# Patient Record
Sex: MALE | Race: WHITE
[De-identification: names, ages, dates, MRNs, and addresses within clinical notes are randomized per-mention and may not be internally consistent; named-entity substitution may affect disease eponyms.]

---

## 2019-12-21 ENCOUNTER — HOSPITAL ENCOUNTER (EMERGENCY)
Dept: HOSPITAL 47 - EC | Age: 29
LOS: 1 days | Discharge: HOME | End: 2019-12-22
Payer: COMMERCIAL

## 2019-12-21 VITALS — TEMPERATURE: 98.1 F

## 2019-12-21 DIAGNOSIS — F17.200: ICD-10-CM

## 2019-12-21 DIAGNOSIS — F32.9: Primary | ICD-10-CM

## 2019-12-21 DIAGNOSIS — R45.851: ICD-10-CM

## 2019-12-21 DIAGNOSIS — Z63.79: ICD-10-CM

## 2019-12-21 PROCEDURE — 82075 ASSAY OF BREATH ETHANOL: CPT

## 2019-12-21 PROCEDURE — 99285 EMERGENCY DEPT VISIT HI MDM: CPT

## 2019-12-21 NOTE — ED
Psych HPI





- General


Source: patient, RN notes reviewed, old records reviewed


Mode of arrival: ambulatory





- History of Present Illness


MD Complaint: suicidal ideation, feels depressed


-: days(s)


Associated Psychiatric Symptoms: depression, suicidal ideation


History of same: Yes


Quality: constant


Improves With: medication


Worsens With: medication


Context: recent drug abuse, significant life stressor


Associated Symptoms: denies other symptoms


Treatments Prior to Arrival: placed on mental health hold


If Self Harm: admits thoughts of self harm





<Prosper Pinto - Last Filed: 12/21/19 20:36>





<Tereso Cotter - Last Filed: 12/22/19 00:41>





- General


Chief Complaint: Psychiatric Symptoms


Stated Complaint: Mental Health


Time Seen by Provider: 12/21/19 20:12





- History of Present Illness


Initial Comments: 





this is a 2-year-old male date ER for evaluation of severe depression decreased 

desire to live significant life stressors including possible impending loss of 

home inability care of child moistly relieving him, just also having difficulty 

with friend's death difficulties in his social clubs as well as his workplace.  

Patient denying drugs or alcohol cipher marijuana no history of psychiatric 

illness or prior evaluation for superduper depression and suicidal ideation.  

Patient is crying during history taking (Prosper Pinto)





- Related Data


                                    Allergies











Allergy/AdvReac Type Severity Reaction Status Date / Time


 


No Known Allergies Allergy   Verified 12/21/19 20:05














Review of Systems


ROS Other: All systems not noted in ROS Statement are negative.





<Prosper Pinto - Last Filed: 12/21/19 20:36>


ROS Other: All systems not noted in ROS Statement are negative.





<Tereso Cotter - Last Filed: 12/22/19 00:41>


ROS Statement: 


Those systems with pertinent positive or pertinent negative responses have been 

documented in the HPI.








Past Medical History


Past Medical History: No Reported History


Additional Past Medical History / Comment(s): ppd


History of Any Multi-Drug Resistant Organisms: None Reported


Past Surgical History: No Surgical Hx Reported


Past Psychological History: Anxiety, Depression


Smoking Status: Current every day smoker


Past Alcohol Use History: Occasional


Past Drug Use History: Marijuana





<Prosper Pinto - Last Filed: 12/21/19 20:36>





General Exam


Limitations: no limitations


General appearance: alert, in no apparent distress


Head exam: Present: atraumatic, normocephalic, normal inspection


Eye exam: Present: normal appearance, PERRL, EOMI.  Absent: scleral icterus, 

conjunctival injection, periorbital swelling


ENT exam: Present: normal exam, mucous membranes moist


Neck exam: Present: normal inspection.  Absent: tenderness, meningismus, 

lymphadenopathy


Respiratory exam: Present: normal lung sounds bilaterally.  Absent: respiratory 

distress, wheezes, rales, rhonchi, stridor


Cardiovascular Exam: Present: regular rate, normal rhythm, normal heart sounds. 

Absent: systolic murmur, diastolic murmur, rubs, gallop, clicks


GI/Abdominal exam: Present: soft, normal bowel sounds.  Absent: distended, 

tenderness, guarding, rebound, rigid


Extremities exam: Present: normal inspection, full ROM, normal capillary refill.

 Absent: tenderness, pedal edema, joint swelling, calf tenderness


Back exam: Present: normal inspection


Neurological exam: Present: alert, oriented X3, CN II-XII intact


Psychiatric exam: Present: normal affect, normal mood


Skin exam: Present: warm, dry, intact, normal color.  Absent: rash





<Prosper Pinto - Last Filed: 12/21/19 20:36>





Course





<Prosper Pinto - Last Filed: 12/21/19 20:36>





                                   Vital Signs











  12/21/19





  20:00


 


Temperature 98.1 F


 


Pulse Rate 100


 


Respiratory 20





Rate 


 


Blood Pressure 141/89


 


O2 Sat by Pulse 96





Oximetry 














- Reevaluation(s)


Reevaluation #1: 





12/21/19 20:37


medical clear for psychiatric evaluation (Prosper Pinto)





Disposition





<Prosper Pinto - Last Filed: 12/21/19 20:36>


Is patient prescribed a controlled substance at d/c from ED?: No





<Tereso Cotter - Last Filed: 12/22/19 00:41>


Clinical Impression: 


 Mood disorder





Disposition: HOME SELF-CARE


Condition: Good


Instructions (If sedation given, give patient instructions):  Mood Disorders 

(ED)


Referrals: 


None,Stated [Primary Care Provider] - 1-2 days

## 2019-12-22 VITALS — SYSTOLIC BLOOD PRESSURE: 116 MMHG | HEART RATE: 98 BPM | RESPIRATION RATE: 47 BRPM | DIASTOLIC BLOOD PRESSURE: 68 MMHG

## 2020-01-15 ENCOUNTER — HOSPITAL ENCOUNTER (EMERGENCY)
Dept: HOSPITAL 47 - EC | Age: 30
Discharge: HOME | End: 2020-01-15
Payer: COMMERCIAL

## 2020-01-15 VITALS — RESPIRATION RATE: 18 BRPM

## 2020-01-15 VITALS — DIASTOLIC BLOOD PRESSURE: 78 MMHG | SYSTOLIC BLOOD PRESSURE: 114 MMHG | HEART RATE: 75 BPM

## 2020-01-15 VITALS — TEMPERATURE: 97.7 F

## 2020-01-15 DIAGNOSIS — F17.200: ICD-10-CM

## 2020-01-15 DIAGNOSIS — R10.84: Primary | ICD-10-CM

## 2020-01-15 DIAGNOSIS — R11.2: ICD-10-CM

## 2020-01-15 DIAGNOSIS — D72.829: ICD-10-CM

## 2020-01-15 DIAGNOSIS — R51: ICD-10-CM

## 2020-01-15 LAB
ALBUMIN SERPL-MCNC: 4.8 G/DL (ref 3.5–5)
ALP SERPL-CCNC: 83 U/L (ref 38–126)
ALT SERPL-CCNC: 94 U/L (ref 4–49)
ANION GAP SERPL CALC-SCNC: 10 MMOL/L
APTT BLD: 23.8 SEC (ref 22–30)
AST SERPL-CCNC: 49 U/L (ref 17–59)
BASOPHILS # BLD AUTO: 0.2 K/UL (ref 0–0.2)
BASOPHILS NFR BLD AUTO: 2 %
BUN SERPL-SCNC: 23 MG/DL (ref 9–20)
CALCIUM SPEC-MCNC: 9.8 MG/DL (ref 8.4–10.2)
CHLORIDE SERPL-SCNC: 105 MMOL/L (ref 98–107)
CO2 SERPL-SCNC: 25 MMOL/L (ref 22–30)
EOSINOPHIL # BLD AUTO: 0.2 K/UL (ref 0–0.7)
EOSINOPHIL NFR BLD AUTO: 2 %
ERYTHROCYTE [DISTWIDTH] IN BLOOD BY AUTOMATED COUNT: 5.62 M/UL (ref 4.3–5.9)
ERYTHROCYTE [DISTWIDTH] IN BLOOD: 12.3 % (ref 11.5–15.5)
GLUCOSE SERPL-MCNC: 114 MG/DL (ref 74–99)
HCT VFR BLD AUTO: 50.1 % (ref 39–53)
HGB BLD-MCNC: 17.4 GM/DL (ref 13–17.5)
HYALINE CASTS UR QL AUTO: 3 /LPF (ref 0–2)
INR PPP: 0.9 (ref ?–1.2)
LYMPHOCYTES # SPEC AUTO: 0.9 K/UL (ref 1–4.8)
LYMPHOCYTES NFR SPEC AUTO: 7 %
MAGNESIUM SPEC-SCNC: 1.6 MG/DL (ref 1.6–2.3)
MCH RBC QN AUTO: 31 PG (ref 25–35)
MCHC RBC AUTO-ENTMCNC: 34.8 G/DL (ref 31–37)
MCV RBC AUTO: 89.1 FL (ref 80–100)
MONOCYTES # BLD AUTO: 0.6 K/UL (ref 0–1)
MONOCYTES NFR BLD AUTO: 4 %
NEUTROPHILS # BLD AUTO: 11.8 K/UL (ref 1.3–7.7)
NEUTROPHILS NFR BLD AUTO: 85 %
PH UR: 5 [PH] (ref 5–8)
PLATELET # BLD AUTO: 242 K/UL (ref 150–450)
POTASSIUM SERPL-SCNC: 4.5 MMOL/L (ref 3.5–5.1)
PROT SERPL-MCNC: 8 G/DL (ref 6.3–8.2)
PT BLD: 9.5 SEC (ref 9–12)
RBC UR QL: <1 /HPF (ref 0–5)
SODIUM SERPL-SCNC: 140 MMOL/L (ref 137–145)
SP GR UR: 1.02 (ref 1–1.03)
SQUAMOUS UR QL AUTO: <1 /HPF (ref 0–4)
UROBILINOGEN UR QL STRIP: <2 MG/DL (ref ?–2)
WBC # BLD AUTO: 13.9 K/UL (ref 3.8–10.6)
WBC #/AREA URNS HPF: 1 /HPF (ref 0–5)

## 2020-01-15 PROCEDURE — 74022 RADEX COMPL AQT ABD SERIES: CPT

## 2020-01-15 PROCEDURE — 85730 THROMBOPLASTIN TIME PARTIAL: CPT

## 2020-01-15 PROCEDURE — 74177 CT ABD & PELVIS W/CONTRAST: CPT

## 2020-01-15 PROCEDURE — 85025 COMPLETE CBC W/AUTO DIFF WBC: CPT

## 2020-01-15 PROCEDURE — 96374 THER/PROPH/DIAG INJ IV PUSH: CPT

## 2020-01-15 PROCEDURE — 99284 EMERGENCY DEPT VISIT MOD MDM: CPT

## 2020-01-15 PROCEDURE — 93005 ELECTROCARDIOGRAM TRACING: CPT

## 2020-01-15 PROCEDURE — 81001 URINALYSIS AUTO W/SCOPE: CPT

## 2020-01-15 PROCEDURE — 36415 COLL VENOUS BLD VENIPUNCTURE: CPT

## 2020-01-15 PROCEDURE — 83690 ASSAY OF LIPASE: CPT

## 2020-01-15 PROCEDURE — 83735 ASSAY OF MAGNESIUM: CPT

## 2020-01-15 PROCEDURE — 70450 CT HEAD/BRAIN W/O DYE: CPT

## 2020-01-15 PROCEDURE — 85610 PROTHROMBIN TIME: CPT

## 2020-01-15 PROCEDURE — 80053 COMPREHEN METABOLIC PANEL: CPT

## 2020-01-15 NOTE — CT
EXAMINATION TYPE: CT brain wo con

 

DATE OF EXAM: 1/15/2020

 

COMPARISON: None.

 

HISTORY: Fall, head trauma with headache.

 

CT DLP: 996 mGycm.  Automated Exposure Control for Dose Reduction was Utilized.

 

 

TECHNIQUE: CT scan of the head is performed without contrast.

 

FINDINGS:   There is no acute intracranial hemorrhage, mass effect, or midline shift identified.  The
 ventricles and sulci are within normal limits in size.  Gray-white matter differentiation is preserv
ed. The globes are intact and the visualized sinuses are clear. The calvarium is intact.

 

IMPRESSION:  No acute intracranial hemorrhage, mass effect, or midline shift is seen.

## 2020-01-15 NOTE — XR
EXAMINATION TYPE: XR abdomen acute w cxr

 

DATE OF EXAM: 1/15/2020

 

COMPARISON: NONE

 

HISTORY: Pain, nausea and dizziness

 

TECHNIQUE:  Supine, upright, and frontal chest views of the abdomen and chest are obtained.

 

FINDINGS:  

 

There is no evidence for pneumoperitoneum.  

 

The bowel gas pattern is unremarkable as there is air throughout nondilated small and large bowel.  

 

No sizeable air fluid levels.

 

No mass effects are seen.  

 

No unusual calcifications.  

 

IMPRESSION: 

Unremarkable study

## 2020-01-15 NOTE — ED
General Adult HPI





- General


Chief complaint: Head Injury


Stated complaint: FALL


Time Seen by Provider: 01/15/20 07:45


Source: patient


Mode of arrival: wheelchair


Limitations: no limitations





- History of Present Illness


Initial comments: 


Dictation was produced using dragon dictation software. please excuse any 

grammatical, word or spelling errors. 





Chief Complaint: 29-year-old male with no significant past medical history 

presents with episode of lower extremity weakness, fall and head trauma.  He 

also has abdominal pain.





History of Present Illness: Is a 29-year-old male he woke up this morning 

getting ready for work when she was walking back from the bathroom.  He felt a 

brief episode of lower extremity weakness causing him to fall.  Patient was at 

home by himself.  Patient does not know if he lost consciousness.  Patient has 

been battling nausea vomiting and diarrhea for the last several days.  Patient 

states he is also diffuse abdominal pain.  He has been feeling nauseous.  

Patient reports that his pain is diffuse however worse in the suprapubic area.  

Denies any medical problems.  Patient states that after that are not he felt 

better.  States that his emesis is nonbloody and non-bilious.  Same with his 

diarrhea is nonbloody nonbilious.  He reports his diarrhea is watery.  No recent

travel, antibiotics or hospitalizations.  She denies any weakness, numbness to 

any paresthesias to extremities.  He has no previous history of syncope.  He 

does report a mild headache since the fall.  Fall occurred approximately 1 hour 

prior to arrival.








The ROS documented in this emergency department record has been reviewed and 

confirmed by me.  Those systems with pertinent positive or negative responses 

have been documented in the HPI.  All other systems are other negative and/or 

noncontributory.








PHYSICAL EXAM:


General Impression: Alert and oriented x3, not in acute distress


HEENT: Small hematoma over the forehead, extra-ocular movements intact, pupils 

equal and reactive to light bilaterally, mucous membranes moist.


Cardiovascular: Heart regular rate and rhythm, S1&S2 audible, no murmurs, rubs 

or gallops


Chest: Lungs clear to auscultation bilaterally, no rhonchi, no wheeze, no rales


Abdomen: Diffuse abdominal tenderness worse in the suprapubic area, there is 

tenderness at McBurney's point


Musculoskeletal: Pulses present and equal in all extremities, no peripheral 

edema


Motor:  no focal deficits noted


Neurological: CN II-XII grossly intact, no focal motor or sensory deficits noted


Skin: Intact with no visualized rashes


Psych: Normal affect and mood





ED course: 29-year-old male presents with episode concerning for syncope, fall, 

head trauma and abdominal pain.  Vital signs upon arrival are within acceptable 

limits.


Laboratory evaluation obtained.  Mild leukocytosis of 13.9, coag panel 

unremarkable.  Metabolic panel is negative.  Urinalysis is negative.  Computed 

tomography scan of the brain, acute abdominal series is negative.  Given the 

patient an elevated white count there was concern of acute appendicitis.  CT 

abdomen and pelvis was unremarkable for appendicitis.  Patient observed in 

emergency department for couple hours.  No changes in symptoms.  Patient clear 

for discharge.





EKG interpretation: Ventricular rate 82, normal sinus rhythm,.  Interval 1:30, 

care is 92, QTc 432. No NJ prolongation, no QTC prolongation, no ST or T-wave 

changes noted.    Overall, this EKG is unremarkable











- Related Data


                                    Allergies











Allergy/AdvReac Type Severity Reaction Status Date / Time


 


No Known Allergies Allergy   Verified 12/21/19 20:05














Review of Systems


ROS Statement: 


Those systems with pertinent positive or pertinent negative responses have been 

documented in the HPI.





ROS Other: All systems not noted in ROS Statement are negative.





Past Medical History


Past Medical History: No Reported History


Additional Past Medical History / Comment(s): ppd


History of Any Multi-Drug Resistant Organisms: None Reported


Past Surgical History: No Surgical Hx Reported


Past Psychological History: Anxiety, Bipolar, Depression


Smoking Status: Current every day smoker


Past Alcohol Use History: Occasional


Past Drug Use History: Marijuana





General Exam


Limitations: no limitations





Course


                                   Vital Signs











  01/15/20 01/15/20





  07:41 08:37


 


Temperature 97.7 F 


 


Pulse Rate 93 85


 


Respiratory 19 18





Rate  


 


Blood Pressure 118/81 123/77


 


O2 Sat by Pulse 96 98





Oximetry  














Medical Decision Making





- Lab Data


Result diagrams: 


                                 01/15/20 08:05





                                 01/15/20 08:05


                                   Lab Results











  01/15/20 01/15/20 01/15/20 Range/Units





  08:05 08:05 08:05 


 


WBC  13.9 H    (3.8-10.6)  k/uL


 


RBC  5.62    (4.30-5.90)  m/uL


 


Hgb  17.4    (13.0-17.5)  gm/dL


 


Hct  50.1    (39.0-53.0)  %


 


MCV  89.1    (80.0-100.0)  fL


 


MCH  31.0    (25.0-35.0)  pg


 


MCHC  34.8    (31.0-37.0)  g/dL


 


RDW  12.3    (11.5-15.5)  %


 


Plt Count  242    (150-450)  k/uL


 


Neutrophils %  85    %


 


Lymphocytes %  7    %


 


Monocytes %  4    %


 


Eosinophils %  2    %


 


Basophils %  2    %


 


Neutrophils #  11.8 H    (1.3-7.7)  k/uL


 


Lymphocytes #  0.9 L    (1.0-4.8)  k/uL


 


Monocytes #  0.6    (0-1.0)  k/uL


 


Eosinophils #  0.2    (0-0.7)  k/uL


 


Basophils #  0.2    (0-0.2)  k/uL


 


PT   9.5   (9.0-12.0)  sec


 


INR   0.9   (<1.2)  


 


APTT   23.8   (22.0-30.0)  sec


 


Sodium    140  (137-145)  mmol/L


 


Potassium    4.5  (3.5-5.1)  mmol/L


 


Chloride    105  ()  mmol/L


 


Carbon Dioxide    25  (22-30)  mmol/L


 


Anion Gap    10  mmol/L


 


BUN    23 H  (9-20)  mg/dL


 


Creatinine    0.76  (0.66-1.25)  mg/dL


 


Est GFR (CKD-EPI)AfAm    >90  (>60 ml/min/1.73 sqM)  


 


Est GFR (CKD-EPI)NonAf    >90  (>60 ml/min/1.73 sqM)  


 


Glucose    114 H  (74-99)  mg/dL


 


Calcium    9.8  (8.4-10.2)  mg/dL


 


Magnesium    1.6  (1.6-2.3)  mg/dL


 


Total Bilirubin    0.6  (0.2-1.3)  mg/dL


 


AST    49  (17-59)  U/L


 


ALT    94 H  (4-49)  U/L


 


Alkaline Phosphatase    83  ()  U/L


 


Total Protein    8.0  (6.3-8.2)  g/dL


 


Albumin    4.8  (3.5-5.0)  g/dL


 


Lipase    25  ()  U/L


 


Urine Color     


 


Urine Appearance     (Clear)  


 


Urine pH     (5.0-8.0)  


 


Ur Specific Gravity     (1.001-1.035)  


 


Urine Protein     (Negative)  


 


Urine Glucose (UA)     (Negative)  


 


Urine Ketones     (Negative)  


 


Urine Blood     (Negative)  


 


Urine Nitrite     (Negative)  


 


Urine Bilirubin     (Negative)  


 


Urine Urobilinogen     (<2.0)  mg/dL


 


Ur Leukocyte Esterase     (Negative)  


 


Urine RBC     (0-5)  /hpf


 


Urine WBC     (0-5)  /hpf


 


Ur Squamous Epith Cells     (0-4)  /hpf


 


Hyaline Casts     (0-2)  /lpf


 


Urine Mucus     (None)  /hpf














  01/15/20 Range/Units





  08:05 


 


WBC   (3.8-10.6)  k/uL


 


RBC   (4.30-5.90)  m/uL


 


Hgb   (13.0-17.5)  gm/dL


 


Hct   (39.0-53.0)  %


 


MCV   (80.0-100.0)  fL


 


MCH   (25.0-35.0)  pg


 


MCHC   (31.0-37.0)  g/dL


 


RDW   (11.5-15.5)  %


 


Plt Count   (150-450)  k/uL


 


Neutrophils %   %


 


Lymphocytes %   %


 


Monocytes %   %


 


Eosinophils %   %


 


Basophils %   %


 


Neutrophils #   (1.3-7.7)  k/uL


 


Lymphocytes #   (1.0-4.8)  k/uL


 


Monocytes #   (0-1.0)  k/uL


 


Eosinophils #   (0-0.7)  k/uL


 


Basophils #   (0-0.2)  k/uL


 


PT   (9.0-12.0)  sec


 


INR   (<1.2)  


 


APTT   (22.0-30.0)  sec


 


Sodium   (137-145)  mmol/L


 


Potassium   (3.5-5.1)  mmol/L


 


Chloride   ()  mmol/L


 


Carbon Dioxide   (22-30)  mmol/L


 


Anion Gap   mmol/L


 


BUN   (9-20)  mg/dL


 


Creatinine   (0.66-1.25)  mg/dL


 


Est GFR (CKD-EPI)AfAm   (>60 ml/min/1.73 sqM)  


 


Est GFR (CKD-EPI)NonAf   (>60 ml/min/1.73 sqM)  


 


Glucose   (74-99)  mg/dL


 


Calcium   (8.4-10.2)  mg/dL


 


Magnesium   (1.6-2.3)  mg/dL


 


Total Bilirubin   (0.2-1.3)  mg/dL


 


AST   (17-59)  U/L


 


ALT   (4-49)  U/L


 


Alkaline Phosphatase   ()  U/L


 


Total Protein   (6.3-8.2)  g/dL


 


Albumin   (3.5-5.0)  g/dL


 


Lipase   ()  U/L


 


Urine Color  Yellow  


 


Urine Appearance  Clear  (Clear)  


 


Urine pH  5.0  (5.0-8.0)  


 


Ur Specific Gravity  1.025  (1.001-1.035)  


 


Urine Protein  Negative  (Negative)  


 


Urine Glucose (UA)  Negative  (Negative)  


 


Urine Ketones  Negative  (Negative)  


 


Urine Blood  Trace H  (Negative)  


 


Urine Nitrite  Negative  (Negative)  


 


Urine Bilirubin  Negative  (Negative)  


 


Urine Urobilinogen  <2.0  (<2.0)  mg/dL


 


Ur Leukocyte Esterase  Negative  (Negative)  


 


Urine RBC  <1  (0-5)  /hpf


 


Urine WBC  1  (0-5)  /hpf


 


Ur Squamous Epith Cells  <1  (0-4)  /hpf


 


Hyaline Casts  3 H  (0-2)  /lpf


 


Urine Mucus  Few H  (None)  /hpf














Disposition


Clinical Impression: 


 Abdominal pain





Disposition: HOME SELF-CARE


Condition: Good


Instructions (If sedation given, give patient instructions):  Abdominal Pain 

(ED)


Is patient prescribed a controlled substance at d/c from ED?: No


Referrals: 


None,Stated [Primary Care Provider] - 1-2 days


Time of Disposition: 09:36

## 2020-01-15 NOTE — CT
EXAMINATION TYPE: CT abdomen pelvis w con

 

DATE OF EXAM: 1/15/2020

 

COMPARISON: Chest x-ray with acute abdominal series from earlier today

 

HISTORY: RLQ pain

 

CT DLP: 1330.1 mGycm

Automated exposure control for dose reduction was used.

 

TECHNIQUE:  Helical acquisition of images was performed from the lung bases through the pelvis.

 

CONTRAST: 

Performed without Oral Contrast and with IV Contrast, patient injected with 100 mL of Isovue 300.

 

FINDINGS: 

 

LUNG BASES: Dependent atelectasis in both bases. Heart size upper limits of normal.

 

LIVER/GB: Liver is markedly hypodense consistent with diffuse fatty infiltration. It measures upper l
imits of normal.

 

PANCREAS: No significant abnormality is seen.

 

SPLEEN: Spleen measures upper limits of normal.

 

ADRENALS: No significant abnormality is seen.

 

KIDNEYS: Symmetric cortical medullary uptake and excretion without hydronephrosis seen bilaterally. N
o intraluminal calculus in bladder. Single right pelvic phlebolith on axial image 87

 

ABDOMINAL AND PELVIC ADENOPATHY:  None visualized

 

REPRODUCTIVE ORGANS: No significant abnormality is seen

 

URINARY BLADDER:  No significant abnormality is seen.

 

OSSEOUS STRUCTURES:  No significant abnormality is seen.

 

BOWEL:  Suboptimal evaluation of bowel without enteric contrast. Stomach is poorly distended and thus
 suboptimally evaluated. No suspicious small or large bowel dilatation. Normal-appearing appendix is 
seen and coronal images 43 through 51 and axial images 60 through 65. No suspicious dilatation or hector
rounding fat stranding. Mild wall thickening through the left and sigmoid colon with occasional diver
ticula involving sigmoid colon. No significant fat stranding. Terminal ileum is noted within normal l
imits coronal image 46.

 

OTHER: Tiny fat-containing umbilical hernia axial image 54.

 

IMPRESSION:

1. No CT evidence for acute appendicitis.

2. Perhaps mild distal colitis, correlate clinically. Differential includes infectious and inflammato
ry etiologies. Otherwise no suspicious acute findings seen to account for patient's symptoms.

## 2020-02-19 ENCOUNTER — HOSPITAL ENCOUNTER (EMERGENCY)
Dept: HOSPITAL 47 - EC | Age: 30
Discharge: HOME | End: 2020-02-19
Payer: COMMERCIAL

## 2020-02-19 VITALS
RESPIRATION RATE: 18 BRPM | HEART RATE: 93 BPM | DIASTOLIC BLOOD PRESSURE: 77 MMHG | SYSTOLIC BLOOD PRESSURE: 115 MMHG | TEMPERATURE: 98.7 F

## 2020-02-19 DIAGNOSIS — H92.03: ICD-10-CM

## 2020-02-19 DIAGNOSIS — F17.200: ICD-10-CM

## 2020-02-19 DIAGNOSIS — J10.1: Primary | ICD-10-CM

## 2020-02-19 DIAGNOSIS — R00.0: ICD-10-CM

## 2020-02-19 DIAGNOSIS — R11.2: ICD-10-CM

## 2020-02-19 PROCEDURE — 71046 X-RAY EXAM CHEST 2 VIEWS: CPT

## 2020-02-19 PROCEDURE — 87502 INFLUENZA DNA AMP PROBE: CPT

## 2020-02-19 PROCEDURE — 87081 CULTURE SCREEN ONLY: CPT

## 2020-02-19 PROCEDURE — 99283 EMERGENCY DEPT VISIT LOW MDM: CPT

## 2020-02-19 PROCEDURE — 87430 STREP A AG IA: CPT

## 2020-02-19 NOTE — XR
EXAMINATION TYPE: XR chest 2V

 

DATE OF EXAM: 2/19/2020

 

COMPARISON: None

 

HISTORY: 29-year-old male cough and fever

 

TECHNIQUE:  PA and lateral views

 

FINDINGS:  

The cardiomediastinal silhouette, aorta, and pulmonary vasculature are within normal limits. Slightly
 low lung volumes with some crowded vascular markings. No consolidation or pleural effusion.

 

 

IMPRESSION:  

No acute cardiopulmonary process.

## 2021-01-30 NOTE — ED
General Adult HPI





- General


Chief complaint: Fever


Stated complaint: vomiting


Time Seen by Provider: 02/19/20 12:43


Source: patient, RN notes reviewed


Mode of arrival: ambulatory


Limitations: no limitations





- History of Present Illness


Initial comments: 





29-year-old male without any significant past medical history presents to the 

emergency department for a chief complaint of fever.  Patient states he has had 

a fever for the past 3 days.  States that he has had nausea vomiting as well.  

He denies diarrhea.  He denies any associated abdominal pain.  Patient states 

that he has had cough congestion sore throat and bilateral ear pain for the past

3 days as well.  He does admit to body aches.  Patient states he has been taking

aspirin and Motrin for his fevers.Patient has no other complaints at this time 

including shortness of breath, chest pain, abdominal pain, nausea or vomiting, 

headache, or visual changes.





- Related Data


                                  Previous Rx's











 Medication  Instructions  Recorded


 


Ondansetron [Zofran ODT] 4 mg PO Q8HR PRN #15 tab 02/19/20











                                    Allergies











Allergy/AdvReac Type Severity Reaction Status Date / Time


 


No Known Allergies Allergy   Verified 02/19/20 12:40














Review of Systems


ROS Statement: 


Those systems with pertinent positive or pertinent negative responses have been 

documented in the HPI.





ROS Other: All systems not noted in ROS Statement are negative.





Past Medical History


Past Medical History: No Reported History


Additional Past Medical History / Comment(s): ppd


History of Any Multi-Drug Resistant Organisms: None Reported


Past Surgical History: No Surgical Hx Reported


Past Psychological History: Anxiety, Bipolar, Depression


Smoking Status: Current every day smoker


Past Alcohol Use History: Occasional


Past Drug Use History: Marijuana





General Exam


Limitations: no limitations


General appearance: alert, in no apparent distress


Head exam: Present: atraumatic, normocephalic, normal inspection


Eye exam: Present: normal appearance, PERRL, EOMI.  Absent: scleral icterus, 

conjunctival injection, periorbital swelling


ENT exam: Present: normal exam, normal oropharynx (Nonerythematous, uvula 

midline), mucous membranes moist, TM's normal bilaterally, normal external ear 

exam


Neck exam: Present: normal inspection, full ROM.  Absent: tenderness, 

meningismus, lymphadenopathy


Respiratory exam: Present: normal lung sounds bilaterally.  Absent: respiratory 

distress, wheezes, rales, rhonchi, stridor


Cardiovascular Exam: Present: regular rate, normal rhythm, normal heart sounds. 

Absent: systolic murmur, diastolic murmur, rubs, gallop, clicks


GI/Abdominal exam: Present: soft, normal bowel sounds.  Absent: distended, 

tenderness, guarding, rebound, rigid


Neurological exam: Present: alert





Course


                                   Vital Signs











  02/19/20 02/19/20





  12:35 12:45


 


Temperature 101.4 F H 101.8 F H


 


Pulse Rate 109 H 


 


Respiratory 20 





Rate  


 


Blood Pressure 109/75 


 


O2 Sat by Pulse 95 





Oximetry  














Medical Decision Making





- Medical Decision Making





Vitals are stable.  Patient is febrile with a temperature of 101.8 with reflexiv

e tachycardia of 109.  However he is well-appearing, nontoxic.  Patient was 

given Motrin and Zofran.  He did take Tylenol prior to arrival.  Visible exam is

unremarkable.  No respiratory distress.  Chest x-ray shows no acute 

cardiopulmonary process.  Influenza B is detected.  Patient has had symptoms for

3 days and is therefore outside of the recommended range of Tamiflu initiation. 

Discussed Motrin and Tylenol for fever.  Discussed Zofran and small sips of 

fluids for nausea.  Discussed returning if he has any worsening symptoms.





- Lab Data


                                   Lab Results











  02/19/20 02/19/20 Range/Units





  12:39 12:39 


 


Influenza Type A RNA  Not Detected   (Not Detectd)  


 


Influenza Type B (PCR)  Detected H   (Not Detectd)  


 


Group A Strep Rapid   Negative  (Negative)  














Disposition


Clinical Impression: 


 Influenza





Disposition: HOME SELF-CARE


Condition: Fair


Instructions (If sedation given, give patient instructions):  Fever in Adults 

(ED)


Additional Instructions: 


Please take Motrin and Tylenol for fever.  Take Zofran as needed for nausea.  

Take only small sips of fluids at a time to help prevent vomiting.  Return to 

the emergency department if you have any worsening symptoms.


Prescriptions: 


Ondansetron [Zofran ODT] 4 mg PO Q8HR PRN #15 tab


 PRN Reason: Nausea


Is patient prescribed a controlled substance at d/c from ED?: No


Referrals: 


Jed Linda [Primary Care Provider] - 1-2 days


Time of Disposition: 13:58
impaired balance/decreased strength

## 2021-07-14 ENCOUNTER — HOSPITAL ENCOUNTER (EMERGENCY)
Dept: HOSPITAL 47 - EC | Age: 31
Discharge: HOME | End: 2021-07-14
Payer: COMMERCIAL

## 2021-07-14 VITALS — TEMPERATURE: 97.8 F

## 2021-07-14 VITALS — RESPIRATION RATE: 20 BRPM | HEART RATE: 84 BPM | SYSTOLIC BLOOD PRESSURE: 118 MMHG | DIASTOLIC BLOOD PRESSURE: 104 MMHG

## 2021-07-14 DIAGNOSIS — Z79.1: ICD-10-CM
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
## 2021-07-14 DIAGNOSIS — S93.402A: Primary | ICD-10-CM
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
## 2021-07-14 DIAGNOSIS — F31.9: ICD-10-CM

## 2021-07-14 DIAGNOSIS — M25.511: ICD-10-CM

## 2021-07-14 DIAGNOSIS — M54.2: ICD-10-CM

## 2021-07-14 DIAGNOSIS — R51.9: ICD-10-CM

## 2021-07-14 DIAGNOSIS — F12.90: ICD-10-CM

## 2021-07-14 DIAGNOSIS — F17.200: ICD-10-CM

## 2021-07-14 LAB
ALBUMIN SERPL-MCNC: 5.1 G/DL (ref 3.5–5)
ALP SERPL-CCNC: 91 U/L (ref 38–126)
ALT SERPL-CCNC: 60 U/L (ref 4–49)
ANION GAP SERPL CALC-SCNC: 12 MMOL/L
APTT BLD: 23.6 SEC (ref 22–30)
AST SERPL-CCNC: 40 U/L (ref 17–59)
BASOPHILS # BLD AUTO: 0.1 K/UL (ref 0–0.2)
BASOPHILS NFR BLD AUTO: 1 %
BUN SERPL-SCNC: 20 MG/DL (ref 9–20)
CALCIUM SPEC-MCNC: 10.1 MG/DL (ref 8.4–10.2)
CHLORIDE SERPL-SCNC: 104 MMOL/L (ref 98–107)
CO2 SERPL-SCNC: 25 MMOL/L (ref 22–30)
EOSINOPHIL # BLD AUTO: 0.2 K/UL (ref 0–0.7)
EOSINOPHIL NFR BLD AUTO: 2 %
ERYTHROCYTE [DISTWIDTH] IN BLOOD BY AUTOMATED COUNT: 5.52 M/UL (ref 4.3–5.9)
ERYTHROCYTE [DISTWIDTH] IN BLOOD: 12.2 % (ref 11.5–15.5)
GLUCOSE BLD-MCNC: 90 MG/DL (ref 75–99)
GLUCOSE SERPL-MCNC: 95 MG/DL (ref 74–99)
HCT VFR BLD AUTO: 48.9 % (ref 39–53)
HGB BLD-MCNC: 17.5 GM/DL (ref 13–17.5)
INR PPP: 0.9 (ref ?–1.2)
LYMPHOCYTES # SPEC AUTO: 2.4 K/UL (ref 1–4.8)
LYMPHOCYTES NFR SPEC AUTO: 21 %
MCH RBC QN AUTO: 31.7 PG (ref 25–35)
MCHC RBC AUTO-ENTMCNC: 35.8 G/DL (ref 31–37)
MCV RBC AUTO: 88.7 FL (ref 80–100)
MONOCYTES # BLD AUTO: 0.6 K/UL (ref 0–1)
MONOCYTES NFR BLD AUTO: 5 %
NEUTROPHILS # BLD AUTO: 8 K/UL (ref 1.3–7.7)
NEUTROPHILS NFR BLD AUTO: 70 %
PH UR: 5.5 [PH] (ref 5–8)
PLATELET # BLD AUTO: 274 K/UL (ref 150–450)
POTASSIUM SERPL-SCNC: 4 MMOL/L (ref 3.5–5.1)
PROT SERPL-MCNC: 8 G/DL (ref 6.3–8.2)
PT BLD: 10.1 SEC (ref 9–12)
SODIUM SERPL-SCNC: 141 MMOL/L (ref 137–145)
SP GR UR: 1.02 (ref 1–1.03)
UROBILINOGEN UR QL STRIP: <2 MG/DL (ref ?–2)
WBC # BLD AUTO: 11.5 K/UL (ref 3.8–10.6)

## 2021-07-14 PROCEDURE — 80306 DRUG TEST PRSMV INSTRMNT: CPT

## 2021-07-14 PROCEDURE — 70450 CT HEAD/BRAIN W/O DYE: CPT

## 2021-07-14 PROCEDURE — 86900 BLOOD TYPING SEROLOGIC ABO: CPT

## 2021-07-14 PROCEDURE — 73610 X-RAY EXAM OF ANKLE: CPT

## 2021-07-14 PROCEDURE — 84484 ASSAY OF TROPONIN QUANT: CPT

## 2021-07-14 PROCEDURE — 86901 BLOOD TYPING SEROLOGIC RH(D): CPT

## 2021-07-14 PROCEDURE — 81003 URINALYSIS AUTO W/O SCOPE: CPT

## 2021-07-14 PROCEDURE — 99284 EMERGENCY DEPT VISIT MOD MDM: CPT

## 2021-07-14 PROCEDURE — 86850 RBC ANTIBODY SCREEN: CPT

## 2021-07-14 PROCEDURE — 71045 X-RAY EXAM CHEST 1 VIEW: CPT

## 2021-07-14 PROCEDURE — 85610 PROTHROMBIN TIME: CPT

## 2021-07-14 PROCEDURE — 72125 CT NECK SPINE W/O DYE: CPT

## 2021-07-14 PROCEDURE — 36415 COLL VENOUS BLD VENIPUNCTURE: CPT

## 2021-07-14 PROCEDURE — 85025 COMPLETE CBC W/AUTO DIFF WBC: CPT

## 2021-07-14 PROCEDURE — 93005 ELECTROCARDIOGRAM TRACING: CPT

## 2021-07-14 PROCEDURE — 96374 THER/PROPH/DIAG INJ IV PUSH: CPT

## 2021-07-14 PROCEDURE — 85730 THROMBOPLASTIN TIME PARTIAL: CPT

## 2021-07-14 PROCEDURE — 73030 X-RAY EXAM OF SHOULDER: CPT

## 2021-07-14 PROCEDURE — 73000 X-RAY EXAM OF COLLAR BONE: CPT

## 2021-07-14 PROCEDURE — 80320 DRUG SCREEN QUANTALCOHOLS: CPT

## 2021-07-14 PROCEDURE — 72170 X-RAY EXAM OF PELVIS: CPT

## 2021-07-14 PROCEDURE — 80053 COMPREHEN METABOLIC PANEL: CPT
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
## 2023-03-27 ENCOUNTER — HOSPITAL ENCOUNTER (INPATIENT)
Dept: HOSPITAL 47 - EC | Age: 33
LOS: 2 days | Discharge: HOME | DRG: 881 | End: 2023-03-29
Attending: PSYCHIATRY & NEUROLOGY | Admitting: PSYCHIATRY & NEUROLOGY
Payer: COMMERCIAL

## 2023-03-27 DIAGNOSIS — F14.10: ICD-10-CM

## 2023-03-27 DIAGNOSIS — Z63.72: ICD-10-CM

## 2023-03-27 DIAGNOSIS — E03.9: ICD-10-CM

## 2023-03-27 DIAGNOSIS — G47.00: ICD-10-CM

## 2023-03-27 DIAGNOSIS — F60.3: ICD-10-CM

## 2023-03-27 DIAGNOSIS — Z81.1: ICD-10-CM

## 2023-03-27 DIAGNOSIS — Z28.310: ICD-10-CM

## 2023-03-27 DIAGNOSIS — Z56.0: ICD-10-CM

## 2023-03-27 DIAGNOSIS — F43.21: Primary | ICD-10-CM

## 2023-03-27 DIAGNOSIS — F12.10: ICD-10-CM

## 2023-03-27 DIAGNOSIS — Z62.810: ICD-10-CM

## 2023-03-27 DIAGNOSIS — Z71.51: ICD-10-CM

## 2023-03-27 DIAGNOSIS — Z20.822: ICD-10-CM

## 2023-03-27 DIAGNOSIS — Z28.21: ICD-10-CM

## 2023-03-27 DIAGNOSIS — Z79.899: ICD-10-CM

## 2023-03-27 DIAGNOSIS — F43.10: ICD-10-CM

## 2023-03-27 DIAGNOSIS — R45.851: ICD-10-CM

## 2023-03-27 LAB
PH UR: 5.5 [PH] (ref 5–8)
SP GR UR: 1.01 (ref 1–1.03)
UROBILINOGEN UR QL STRIP: <2 MG/DL (ref ?–2)

## 2023-03-27 PROCEDURE — 81003 URINALYSIS AUTO W/O SCOPE: CPT

## 2023-03-27 PROCEDURE — 84439 ASSAY OF FREE THYROXINE: CPT

## 2023-03-27 PROCEDURE — 82075 ASSAY OF BREATH ETHANOL: CPT

## 2023-03-27 PROCEDURE — 99285 EMERGENCY DEPT VISIT HI MDM: CPT

## 2023-03-27 PROCEDURE — 80306 DRUG TEST PRSMV INSTRMNT: CPT

## 2023-03-27 PROCEDURE — 83036 HEMOGLOBIN GLYCOSYLATED A1C: CPT

## 2023-03-27 PROCEDURE — 84443 ASSAY THYROID STIM HORMONE: CPT

## 2023-03-27 PROCEDURE — 87636 SARSCOV2 & INF A&B AMP PRB: CPT

## 2023-03-27 PROCEDURE — 80061 LIPID PANEL: CPT

## 2023-03-27 PROCEDURE — 85025 COMPLETE CBC W/AUTO DIFF WBC: CPT

## 2023-03-27 PROCEDURE — 80053 COMPREHEN METABOLIC PANEL: CPT

## 2023-03-27 SDOH — ECONOMIC STABILITY - INCOME SECURITY: UNEMPLOYMENT, UNSPECIFIED: Z56.0

## 2023-03-27 NOTE — ED
Psych HPI





- General


Chief Complaint: Psychiatric Symptoms


Stated Complaint: EPS eval


Time Seen by Provider: 03/27/23 11:42


Source: patient, family (girlfriend), RN notes reviewed, old records reviewed


Mode of arrival: ambulatory





- History of Present Illness


Initial Comments: 





32-year-old male presents to the emergency room with his girlfriend complaining 

of suicidal ideations with no plan, states "I'll think of somethin".  States 

thoughts stem from a misunderstanding between his girlfriend, his wife and 

himself.  He does live alone.  Girlfriend at bedside states his wife moved out 3

weeks ago.  Patient does have a history of depression and has been on Prozac and

taking his medications regularly.  He denies any alcohol or drug use today.


MD Complaint: suicidal ideation


-: days(s) (1)


Associated Psychiatric Symptoms: depression, suicidal ideation


History of same: Yes


Quality: getting worse


Associated Symptoms: denies other symptoms


If Self Harm: admits thoughts of self harm, other (will not admit to plan "i'll 

think of something")





- Related Data


                                Home Medications











 Medication  Instructions  Recorded  Confirmed


 


Acetaminophen Tab [Tylenol Tab] 1,000 mg PO Q6HR PRN 07/14/21 03/27/23


 


FLUoxetine HCL 40 mg PO DAILY 03/27/23 03/27/23


 


FLUoxetine HCL [PROzac] 20 mg PO DAILY 03/27/23 03/27/23


 


Lion's Flex Supplement 1 dose PO DAILY 03/27/23 03/27/23


 


traZODone HCL [Desyrel] 25 - 100 mg PO HS PRN 03/27/23 03/27/23








                                  Previous Rx's











 Medication  Instructions  Recorded


 


Ibuprofen [Motrin] 600 mg PO Q6HR PRN #20 tab 07/14/21











                                    Allergies











Allergy/AdvReac Type Severity Reaction Status Date / Time


 


[place] Allergy  Anaphylaxis Uncoded 03/27/23 13:59


 


dust mites Allergy  runny Uncoded 03/27/23 13:59





   nose,  





   sneezing  














Review of Systems


ROS Statement: 


Those systems with pertinent positive or pertinent negative responses have been 

documented in the HPI.





ROS Other: All systems not noted in ROS Statement are negative.





Past Medical History


Past Medical History: No Reported History


Additional Past Medical History / Comment(s): ppd


History of Any Multi-Drug Resistant Organisms: None Reported


Past Surgical History: No Surgical Hx Reported


Past Psychological History: Anxiety, Bipolar, Depression


Smoking Status: Current every day smoker


Past Alcohol Use History: Occasional


Past Drug Use History: Marijuana





General Exam


Limitations: no limitations


General appearance: alert, in no apparent distress


Head exam: Present: atraumatic


Eye exam: Present: normal appearance.  Absent: scleral icterus, conjunctival 

injection, periorbital swelling


ENT exam: Present: mucous membranes moist


Respiratory exam: Absent: respiratory distress, accessory muscle use


Cardiovascular Exam: Present: tachycardia


Neurological exam: Present: alert, oriented X3, normal gait


Psychiatric exam: Present: depressed, suicidal ideation


Skin exam: Present: warm, dry, normal color.  Absent: cyanosis, diaphoretic, 

petechiae, pallor





Course


                                   Vital Signs











  03/27/23





  11:44


 


Temperature 98.2 F


 


Pulse Rate 110 H


 


Respiratory 22





Rate 


 


Blood Pressure 149/79


 


O2 Sat by Pulse 97





Oximetry 














Medical Decision Making





- Medical Decision Making


Patient presents with increasing depression and suicidal ideation.  Did speak 

with EPS nurse and patient is agreeable to signing himself in for mental health 

treatment.  Case discussed with Dr. Lindsey.





Was pt. sent in by a medical professional or institution (, PA, NP, urgent 

care, hospital, or nursing home...) When possible be specific


@  -No


Did you speak to anyone other than the patient for history (EMS, parent, family,

 police, friend...)? What history was obtained from this source 


@  -patients girlfriend at bedside


Did you review nursing and triage notes (agree or disagree)?  Why? 


@  -I reviewed and agree with nursing and triage notes


Were old charts reviewed (outside hosp., previous admission, EMS record, old 

EKG, old radiological studies, urgent care reports/EKG's, nursing home records)?

 Report findings 


@  -No old charts were reviewed


Differential Diagnosis (chest pain, altered mental status, abdominal pain women,

 abdominal pain men, vaginal bleeding, weakness, fever, dyspnea, syncope, 

headache, dizziness, GI bleed, back pain, seizure, CVA, palpatations, mental 

health, musculoskeletal)? 


@  -Differential Mental Health


Depression, anxiety, bipolar, psychosis, schizophrenia, borderline personality, 

situational depression, adjustment disorder, behavioral disorder, brain tumor, 

malingering, substance abuse, encephalopathy, medication reaction, dementia, 

hypothyroidism, degenerative neurologic disorder, lupus.... This is not meant to

 be all-inclusive list


EKG interpreted by me (3pts min.).


@  -n/a


X-rays interpreted by me (1pt min.).


@  -None done


CT interpreted by me (1pt min.).


@  -None done


U/S interpreted by me (1pt. min.).


@  -None done


What testing was considered but not performed or refused? (CT, X-rays, U/S, 

labs)? Why?


@  -None


What meds were considered but not given or refused? Why?


@  -None


Did you discuss the management of the patient with other professionals 

(professionals i.e. Dr., PA, NP, lab, RT, psych nurse, , , 

teacher, , )? Give summary


@  -EPS nurse Danay


Was smoking cessation discussed for >3mins.?


@  -No


Was critical care preformed (if so, how long)?


@  -No


Were there social determinants of health that impacted care today? How? 

(Homelessness, low income, unemployed, alcoholism, drug addiction, 

transportation, low edu. Level, literacy, decrease access to med. care, senior living, 

rehab)?


@  -No


Was there de-escalation of care discussed even if they declined (Discuss DNR or 

withdrawal of care, Hospice)? DNR status


@  -No


What co-morbidities impacted this encounter? (DM, HTN, Smoking, COPD, CAD, 

Cancer, CVA, ARF, Chemo, Hep., AIDS, mental health diagnosis, sleep apnea, 

morbid obesity)?


@  -Depression


Was patient admitted / discharged? Hospital course, mention meds given and 

route, prescriptions, significant lab abnormalities, going to OR and other 

pertinent info.


@  -Admitted


Undiagnosed new problem with uncertain prognosis?


@  -No


Drug Therapy requiring intensive monitoring for toxicity (Heparin, Nitro, 

Insulin, Cardizem)?


@  -No


Were any procedures done?


@  -No


Diagnosis/symptom?


@  -Depression, suicidal ideation


Acute, or Chronic, or Acute on Chronic?


@  -Acute on chronic


Uncomplicated (without systemic symptoms) or Complicated (systemic symptoms)?


@  -default


Side effects of treatment?


@  -No


Exacerbation, Progression, or Severe Exacerbation?


@  -No


Poses a threat to life or bodily function? How? (Chest pain, USA, MI, pneumonia,

 PE, COPD, DKA, ARF, appy, cholecystitis, CVA, Diverticulitis, Homicidal, 

Suicidal, threat to staff... and all critical care pts)


@  -Yes suicidal ideation








- Lab Data


                                   Lab Results











  03/27/23 03/27/23 Range/Units





  14:17 14:17 


 


Urine Color  Light Yellow   


 


Urine Appearance  Clear   (Clear)  


 


Urine pH  5.5   (5.0-8.0)  


 


Ur Specific Gravity  1.008   (1.001-1.035)  


 


Urine Protein  Negative   (Negative)  


 


Urine Glucose (UA)  Negative   (Negative)  


 


Urine Ketones  Negative   (Negative)  


 


Urine Blood  Negative   (Negative)  


 


Urine Nitrite  Negative   (Negative)  


 


Urine Bilirubin  Negative   (Negative)  


 


Urine Urobilinogen  <2.0   (<2.0)  mg/dL


 


Ur Leukocyte Esterase  Negative   (Negative)  


 


Urine Opiates Screen  Not Detected   (NotDetected)  


 


Ur Oxycodone Screen  Not Detected   (NotDetected)  


 


Urine Methadone Screen  Not Detected   (NotDetected)  


 


Ur Propoxyphene Screen  Not Detected   (NotDetected)  


 


Ur Barbiturates Screen  Not Detected   (NotDetected)  


 


U Tricyclic Antidepress  Not Detected   (NotDetected)  


 


Ur Phencyclidine Scrn  Not Detected   (NotDetected)  


 


Ur Amphetamines Screen  Not Detected   (NotDetected)  


 


U Methamphetamines Scrn  Not Detected   (NotDetected)  


 


U Benzodiazepines Scrn  Not Detected   (NotDetected)  


 


Urine Cocaine Screen  Not Detected   (NotDetected)  


 


U Marijuana (THC) Screen  Detected H   (NotDetected)  


 


Influenza Type A (PCR)   Not Detected  (Not Detectd)  


 


Influenza Type B (PCR)   Not Detected  (Not Detectd)  


 


RSV (PCR)   Not Detected  (Not Detectd)  


 


SARS-CoV-2 (PCR)   Not Detected  (Not Detectd)  














Disposition


Clinical Impression: 


 Suicidal ideation, Adjustment reaction of adult life, Depression





Disposition: ADMITTED AS IP TO THIS HOSP


Referrals: 


None,Stated [Primary Care Provider] - 1-2 days


Decision Date: 03/27/23


Decision Time: 14:43

## 2023-03-28 LAB
ALBUMIN SERPL-MCNC: 4.8 G/DL (ref 3.5–5)
ALP SERPL-CCNC: 86 U/L (ref 38–126)
ALT SERPL-CCNC: 27 U/L (ref 4–49)
ANION GAP SERPL CALC-SCNC: 11 MMOL/L
AST SERPL-CCNC: 29 U/L (ref 17–59)
BASOPHILS # BLD AUTO: 0 K/UL (ref 0–0.2)
BASOPHILS NFR BLD AUTO: 1 %
BUN SERPL-SCNC: 13 MG/DL (ref 9–20)
CALCIUM SPEC-MCNC: 9.5 MG/DL (ref 8.4–10.2)
CHLORIDE SERPL-SCNC: 104 MMOL/L (ref 98–107)
CHOLEST SERPL-MCNC: 271 MG/DL (ref 0–200)
CO2 SERPL-SCNC: 24 MMOL/L (ref 22–30)
EOSINOPHIL # BLD AUTO: 0.3 K/UL (ref 0–0.7)
EOSINOPHIL NFR BLD AUTO: 4 %
ERYTHROCYTE [DISTWIDTH] IN BLOOD BY AUTOMATED COUNT: 5.15 M/UL (ref 4.3–5.9)
ERYTHROCYTE [DISTWIDTH] IN BLOOD: 12.5 % (ref 11.5–15.5)
GLUCOSE SERPL-MCNC: 83 MG/DL (ref 74–99)
HCT VFR BLD AUTO: 46.7 % (ref 39–53)
HDLC SERPL-MCNC: 51.4 MG/DL (ref 40–60)
HGB BLD-MCNC: 16.4 GM/DL (ref 13–17.5)
LDLC SERPL CALC-MCNC: 203.4 MG/DL (ref 0–131)
LYMPHOCYTES # SPEC AUTO: 0.7 K/UL (ref 1–4.8)
LYMPHOCYTES NFR SPEC AUTO: 11 %
MCH RBC QN AUTO: 31.8 PG (ref 25–35)
MCHC RBC AUTO-ENTMCNC: 35.1 G/DL (ref 31–37)
MCV RBC AUTO: 90.6 FL (ref 80–100)
MONOCYTES # BLD AUTO: 0.6 K/UL (ref 0–1)
MONOCYTES NFR BLD AUTO: 9 %
NEUTROPHILS # BLD AUTO: 5.2 K/UL (ref 1.3–7.7)
NEUTROPHILS NFR BLD AUTO: 75 %
PLATELET # BLD AUTO: 230 K/UL (ref 150–450)
POTASSIUM SERPL-SCNC: 4.2 MMOL/L (ref 3.5–5.1)
PROT SERPL-MCNC: 7.9 G/DL (ref 6.3–8.2)
SODIUM SERPL-SCNC: 139 MMOL/L (ref 137–145)
TRIGL SERPL-MCNC: 81.2 MG/DL (ref 0–149)
VLDLC SERPL CALC-MCNC: 16.24 MG/DL (ref 5–40)
WBC # BLD AUTO: 6.9 K/UL (ref 3.8–10.6)

## 2023-03-28 RX ADMIN — ACETAMINOPHEN PRN MG: 325 TABLET, FILM COATED ORAL at 20:26

## 2023-03-28 RX ADMIN — ACETAMINOPHEN PRN MG: 325 TABLET, FILM COATED ORAL at 13:20

## 2023-03-28 NOTE — P.MDCNMH
History of Present Illness


H&P Date: 03/28/23





History of Presenting Illness:





Patient is a very pleasant 32-year-old male with a past medical history of PTSD,

borderline personality disorder, anxiety, bipolar disorder, depression, and 

cannabis use.  He presented to the emergency department on 3/27/23 for suicidal 

ideations.  He is currently admitted to inpatient psychiatric unit and we have 

been consulted for medical evaluation while he is there.  Patient was seen and 

fully evaluated in the mental health unit.  He is ambulatory with a steady gait 

unassisted.  Upon physical examination, patient cooperative and denies having 

any pain or complaints at this time.  Patient denies having any headache, 

lightheadedness, dizziness, chest pain, palpitations, shortness of breath, 

abdominal pain, nausea, vomiting, or experiencing any numbness/tingling/weakness

in his extremities.  Patient reports that he works the third shift when he has 

not in the hospital and repeatedly requesting his prescription for trazodone to 

be ordered.  Patient informed that these medications are being managed by 

primary psychiatric team.  He reports





Review of systems:





Pertinent positives and negatives as discussed in HPI, a complete review of 

systems was performed and all other systems are negative.





Physical exam:





Vital signs reviewed and stable. 


General: Nontoxic, no distress and appears stated age.  


Derm: Skin warm and dry, normal coloration for ethnicity.


Head: Atraumatic, normocephalic and symmetric.  


Eyes: EOMs intact, no lid lag, and anicteric sclera


Mouth: no lip lesions, mucus membranes moist


Cardiovascular: regular rate and rhythm, positive posterior tibial pulses 

bilaterally, and cap refill < 2 seconds.  


Lungs: Respirations even, regular, and unlabored on room air.


Abdominal: soft, nontender to palpation


Ext: ROM intact. No gross muscle atrophy, no edema, no contractures


Neuro: Speech clear, face symmetrical and CN II-XII grossly intact with no noted

focal neuro deficits


Psych: Alert and oriented to person, place, time, and situation. Appropriate and

pleasant affect. 





Assessment and Plan of Care:





Labs reviewed.  CBC and CMP were unremarkable.  Lipid profile revealing elevated

cholesterol of 271.0 and elevated LDL of 203.4.  TSH 0.292.  Urinalysis negative

for infection.





Low TSH


-Possible hyperthyroidism, will need free T4 for further evaluation/diagnosis.


-Order placed for free T4 at this time





Cannabinoid use disorder


-Recommend cessation





Hyperlipidemia


-Lipid profile revealed showing elevated cholesterol of 271.0 and elevated LDL 

of 203.4.  Order placed for Lipitor 20 mg nightly.





PTSD


Borderline personality disorder


Anxiety


Bipolar disorder


Depression


Suicidal ideations


-Management per primary admitting psychiatric team.











Thank you for allowing us to participate in the care of this pleasant patient.  

Do not hesitate to contact us with questions.  Someone can be reached from the 

[facility] hospitalist group all hours of the day at [phone] or via 

perfect serve.











Past Medical History


Past Medical History: No Reported History


Additional Past Medical History / Comment(s): ppd


History of Any Multi-Drug Resistant Organisms: None Reported


Past Surgical History: No Surgical Hx Reported


Past Psychological History: Anxiety, Bipolar, Depression


Smoking Status: Former smoker


Past Alcohol Use History: None Reported


Past Drug Use History: Marijuana





Medications and Allergies


                                Home Medications











 Medication  Instructions  Recorded  Confirmed  Type


 


Acetaminophen Tab [Tylenol Tab] 1,000 mg PO Q6HR PRN 07/14/21 03/27/23 History


 


Ibuprofen [Motrin] 600 mg PO Q6HR PRN #20 tab 07/14/21 03/27/23 Rx


 


FLUoxetine HCL 40 mg PO DAILY 03/27/23 03/27/23 History


 


FLUoxetine HCL [PROzac] 20 mg PO DAILY 03/27/23 03/27/23 History


 


Lion's Flex Supplement 1 dose PO DAILY 03/27/23 03/27/23 History


 


traZODone HCL [Desyrel] 25 - 100 mg PO HS PRN 03/27/23 03/27/23 History








                                    Allergies











Allergy/AdvReac Type Severity Reaction Status Date / Time


 


[place] Allergy  Anaphylaxis Uncoded 03/27/23 13:59


 


dust mites Allergy  runny Uncoded 03/27/23 13:59





   nose,  





   sneezing  














Physical Exam


Vitals: 


                                   Vital Signs











  Temp Pulse Pulse Resp BP BP Pulse Ox


 


 03/28/23 00:41  97.0 F L   88  18   128/90  96


 


 03/27/23 20:00   75   18  119/76   98








                                Intake and Output











 03/27/23 03/28/23 03/28/23





 22:59 06:59 14:59


 


Other:   


 


  Weight  83.8 kg 














Cranial Nerve Examination





- Cranial Nerves


Cranial Nerve II- Optic: Intact


Cranial Nerve III- Oculomotor: Intact


Cranial Nerve IV- Trochlear: Intact


Cranial Nerve V- Trigeminal: Intact


Cranial Nerve VI- Abducens: Intact


Cranial Nerve VII- Facial: Intact


Cranial Nerve VIII- Auditory: Intact


Cranial Nerve IX- Glossopharyngeal: Intact


Cranial Nerve X- Vagus: Intact


Cranial Nerve XI- Accessory: Intact


Cranial Nerve XII- Hypoglossal: Intact





Results


CBC & Chem 7: 


                                 03/28/23 07:26





                                 03/28/23 07:26


Labs: 


                  Abnormal Lab Results - Last 24 Hours (Table)











  03/27/23 03/28/23 03/28/23 Range/Units





  14:17 07:26 07:26 


 


Lymphocytes #   0.7 L   (1.0-4.8)  k/uL


 


TSH    0.292 L  (0.465-4.680)  mIU/L


 


U Marijuana (THC) Screen  Detected H    (NotDetected)
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
## 2023-03-29 VITALS
TEMPERATURE: 97.5 F | SYSTOLIC BLOOD PRESSURE: 128 MMHG | DIASTOLIC BLOOD PRESSURE: 69 MMHG | HEART RATE: 133 BPM | RESPIRATION RATE: 20 BRPM
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
## 2023-04-03 ENCOUNTER — HOSPITAL ENCOUNTER (EMERGENCY)
Dept: HOSPITAL 47 - EC | Age: 33
LOS: 1 days | Discharge: HOME | End: 2023-04-04
Payer: COMMERCIAL

## 2023-04-03 DIAGNOSIS — I10: ICD-10-CM

## 2023-04-03 DIAGNOSIS — F12.90: ICD-10-CM

## 2023-04-03 DIAGNOSIS — F41.9: ICD-10-CM

## 2023-04-03 DIAGNOSIS — Z88.8: ICD-10-CM

## 2023-04-03 DIAGNOSIS — F31.9: ICD-10-CM

## 2023-04-03 DIAGNOSIS — Z87.891: ICD-10-CM

## 2023-04-03 DIAGNOSIS — R55: Primary | ICD-10-CM

## 2023-04-03 PROCEDURE — 80053 COMPREHEN METABOLIC PANEL: CPT

## 2023-04-03 PROCEDURE — 85610 PROTHROMBIN TIME: CPT

## 2023-04-03 PROCEDURE — 85730 THROMBOPLASTIN TIME PARTIAL: CPT

## 2023-04-03 PROCEDURE — 36415 COLL VENOUS BLD VENIPUNCTURE: CPT

## 2023-04-03 PROCEDURE — 93005 ELECTROCARDIOGRAM TRACING: CPT

## 2023-04-03 PROCEDURE — 85025 COMPLETE CBC W/AUTO DIFF WBC: CPT

## 2023-04-03 PROCEDURE — 84484 ASSAY OF TROPONIN QUANT: CPT

## 2023-04-03 PROCEDURE — 99284 EMERGENCY DEPT VISIT MOD MDM: CPT

## 2023-04-04 VITALS — RESPIRATION RATE: 16 BRPM

## 2023-04-04 VITALS — DIASTOLIC BLOOD PRESSURE: 83 MMHG | TEMPERATURE: 98.1 F | HEART RATE: 73 BPM | SYSTOLIC BLOOD PRESSURE: 127 MMHG

## 2023-04-04 LAB
ALBUMIN SERPL-MCNC: 4.3 G/DL (ref 3.5–5)
ALP SERPL-CCNC: 71 U/L (ref 38–126)
ALT SERPL-CCNC: 27 U/L (ref 4–49)
ANION GAP SERPL CALC-SCNC: 9 MMOL/L
APTT BLD: 24.6 SEC (ref 22–30)
AST SERPL-CCNC: 30 U/L (ref 17–59)
BASOPHILS # BLD AUTO: 0 K/UL (ref 0–0.2)
BASOPHILS NFR BLD AUTO: 0 %
BUN SERPL-SCNC: 19 MG/DL (ref 9–20)
CALCIUM SPEC-MCNC: 8.9 MG/DL (ref 8.4–10.2)
CHLORIDE SERPL-SCNC: 105 MMOL/L (ref 98–107)
CO2 SERPL-SCNC: 25 MMOL/L (ref 22–30)
EOSINOPHIL # BLD AUTO: 0.2 K/UL (ref 0–0.7)
EOSINOPHIL NFR BLD AUTO: 3 %
ERYTHROCYTE [DISTWIDTH] IN BLOOD BY AUTOMATED COUNT: 5 M/UL (ref 4.3–5.9)
ERYTHROCYTE [DISTWIDTH] IN BLOOD: 12.5 % (ref 11.5–15.5)
GLUCOSE SERPL-MCNC: 95 MG/DL (ref 74–99)
HCT VFR BLD AUTO: 44.1 % (ref 39–53)
HGB BLD-MCNC: 15.9 GM/DL (ref 13–17.5)
INR PPP: 0.9 (ref ?–1.2)
LYMPHOCYTES # SPEC AUTO: 2.5 K/UL (ref 1–4.8)
LYMPHOCYTES NFR SPEC AUTO: 38 %
MCH RBC QN AUTO: 31.9 PG (ref 25–35)
MCHC RBC AUTO-ENTMCNC: 36.2 G/DL (ref 31–37)
MCV RBC AUTO: 88.2 FL (ref 80–100)
MONOCYTES # BLD AUTO: 0.5 K/UL (ref 0–1)
MONOCYTES NFR BLD AUTO: 7 %
NEUTROPHILS # BLD AUTO: 3.3 K/UL (ref 1.3–7.7)
NEUTROPHILS NFR BLD AUTO: 49 %
PLATELET # BLD AUTO: 267 K/UL (ref 150–450)
POTASSIUM SERPL-SCNC: 3.7 MMOL/L (ref 3.5–5.1)
PROT SERPL-MCNC: 7.3 G/DL (ref 6.3–8.2)
PT BLD: 9.7 SEC (ref 9–12)
SODIUM SERPL-SCNC: 139 MMOL/L (ref 137–145)
WBC # BLD AUTO: 6.6 K/UL (ref 3.8–10.6)

## 2023-04-04 NOTE — ED
General Adult HPI





- General


Chief complaint: Syncope


Stated complaint: Syncope


Time Seen by Provider: 04/04/23 04:32


Source: patient, EMS


Mode of arrival: EMS


Limitations: no limitations





- History of Present Illness


Initial comments: 


This is a 32-year-old male with a past medical history including anxiety, 

depression, PTSD and hypertension presented to the emergency department for a 

syncopal episode.  The patient presented via EMS.  The patient stated that he 

was started on a medication and was warned that could cause him to have 

orthostatic hypotension.  The patient stated that he was at work and had an 

argument with his boss when he stood up quickly and had a syncopal episode.  The

patient stated that he remembered immediately what happened and stated that he 

felt warm and flushed before the episode.  The patient was brought in and when I

went to evaluate the patient, the patient had been in the waiting room and 

stated that he was ready to go home and denied of any further complaints.  The 

patient denied any acute pain or distress as well as any shortness of breath, 

chest pain or any other pain.








- Related Data


                                Home Medications











 Medication  Instructions  Recorded  Confirmed


 


Acetaminophen Tab [Tylenol] 1,000 mg PO Q6HR PRN 07/14/21 03/27/23


 


Lion's Flex Supplement 1 dose PO DAILY 03/27/23 03/27/23


 


traZODone HCL [Desyrel] 25 - 100 mg PO HS PRN 03/27/23 03/27/23








                                  Previous Rx's











 Medication  Instructions  Recorded


 


Ibuprofen [Motrin] 600 mg PO Q6HR PRN #20 tab 07/14/21


 


FLUoxetine HCL 40 mg PO DAILY 30 Days #30 cap 03/29/23


 


FLUoxetine HCL [PROzac] 20 mg PO DAILY 30 Days #30 cap 03/29/23


 


Prazosin [Minipress] 2 mg PO HS 30 Days #60 cap 03/29/23











                                    Allergies











Allergy/AdvReac Type Severity Reaction Status Date / Time


 


[place] Allergy  Anaphylaxis Uncoded 04/04/23 00:59


 


dust mites Allergy  runny Uncoded 04/04/23 00:59





   nose,  





   sneezing  














Review of Systems


ROS Statement: 


Those systems with pertinent positive or pertinent negative responses have been 

documented in the HPI.





ROS Other: All systems not noted in ROS Statement are negative.





Past Medical History


Past Medical History: No Reported History


Additional Past Medical History / Comment(s): ppd


History of Any Multi-Drug Resistant Organisms: None Reported


Past Surgical History: No Surgical Hx Reported


Past Psychological History: Anxiety, Bipolar, Depression


Smoking Status: Former smoker


Past Alcohol Use History: None Reported


Past Drug Use History: Marijuana





General Exam


Limitations: no limitations


General appearance: alert, in no apparent distress


Head exam: Present: atraumatic, normocephalic, normal inspection


Eye exam: Present: normal appearance, PERRL


Pupils: Present: normal accommodation


ENT exam: Present: normal exam, normal oropharynx, mucous membranes moist


Neck exam: Present: normal inspection, full ROM


Respiratory exam: Present: normal lung sounds bilaterally


Cardiovascular Exam: Present: regular rate, normal rhythm, normal heart sounds


GI/Abdominal exam: Present: soft, normal bowel sounds


Extremities exam: Present: normal inspection, full ROM


Back exam: Present: normal inspection, full ROM


Neurological exam: Present: alert, oriented X3, CN II-XII intact


Psychiatric exam: Present: normal affect, normal mood


Skin exam: Present: warm, dry





Course


                                   Vital Signs











  04/04/23 04/04/23





  00:59 04:26


 


Temperature 98.8 F 98.1 F


 


Pulse Rate 70 73


 


Respiratory 16 16





Rate  


 


Blood Pressure 130/88 127/83


 


O2 Sat by Pulse 95 92 L





Oximetry  














EKG Findings





- EKG Comments:


EKG Findings:: An EKG was obtained and was interpreted by myself showing a rate 

of 62, OK interval 147, QRS duration of 103 and QTC of 427.  This EKG showed a 

normal sinus rhythm with no ST segment elevation or depression noted.





Medical Decision Making





- Medical Decision Making


Was pt. sent in by a medical professional or institution (, PA, NP, urgent 

care, hospital, or nursing home...) When possible be specific


@  -No


Did you speak to anyone other than the patient for history (EMS, parent, family,

 police, friend...)? What history was obtained from this source 


@  -No


Did you review nursing and triage notes (agree or disagree)?  Why? 


@  -I reviewed and agree with nursing and triage notes


Were old charts reviewed (outside hosp., previous admission, EMS record, old 

EKG, old radiological studies, urgent care reports/EKG's, nursing home records)?

 Report findings 


@  -No old charts were reviewed


Differential Diagnosis (chest pain, altered mental status, abdominal pain women,

 abdominal pain men, vaginal bleeding, weakness, fever, dyspnea, syncope, 

headache, dizziness, GI bleed, back pain, seizure, CVA, palpatations, mental 

health)? 


@  -Prostatic hypotension, vasovagal syncope


EKG interpreted by me (3pts min.).


@  -As above


X-rays interpreted by me (1pt min.).


@  -None done


CT interpreted by me (1pt min.).


@  -None done


U/S interpreted by me (1pt. min.).


@  -None done


What testing was considered but not performed or refused? (CT, X-rays, U/S, 

labs)? Why?


@  -A chest x-ray was considered however the patient stated that he was ready to

 go home and did not want this intervention performed.


What meds were considered but not given or refused? Why?


@  -None


Did you discuss the management of the patient with other professionals 

(professionals i.e. , PA, NP, lab, RT, psych nurse, , , 

teacher, , )? Give summary


@  -No


Was smoking cessation discussed for >3mins.?


@  -Yes


Was critical care preformed (if so, how long)?


@  -No


Were there social determinants of health that impacted care today? How? 

(Homelessness, low income, unemployed, alcoholism, drug addiction, 

transportation, low edu. Level, literacy, decrease access to med. care, group home, 

rehab)?


@  -No


Was there de-escalation of care discussed even if they declined (Discuss DNR or 

withdrawal of care, Hospice)? DNR status


@  -No


What co-morbidities impacted this encounter? (DM, HTN, Smoking, COPD, CAD, 

Cancer, CVA, ARF, Chemo, Hep., AIDS, mental health diagnosis, sleep apnea, 

morbid obesity)?


@  -PTSD, anxiety, depression and hypertension


Was patient admitted / discharged? Hospital course, mention meds given and 

route, prescriptions, significant lab abnormalities, going to OR and other 

pertinent info.


@  -The patient was seen and evaluated emergency department.  Physical exam, the

 patient was resting in bed without any acute distress.  Vital signs admission 

were stable.  Laboratory workup was obtained in triage and was within normal 

limits.  When I went to evaluate the patient, the patient stated that he had no 

complaints or symptoms and would like to be discharged home.  The patient 

understood that the medications that he was warned about did indeed cause him to

 have orthostatic hypotension and he now knows to take his time when getting up 

while on this medication.  The patient that when he further testing and instead 

wanted to follow-up as an outpatient.  I did agree to this and the patient was 

discharged home in stable condition.


Undiagnosed new problem with uncertain prognosis?


@  -No


Drug Therapy requiring intensive monitoring for toxicity (Heparin, Nitro, 

Insulin, Cardizem)?


@  -No


Were any procedures done?


@  -No


Diagnosis/symptom?


@  -Vasovagal syncope secondary to orthostatic hypotension


Acute, or Chronic, or Acute on Chronic?


@  -Acute


Uncomplicated (without systemic symptoms) or Complicated (systemic symptoms)?


@  -Uncomplicated


Side effects of treatment?


@  -No


Exacerbation, Progression, or Severe Exacerbation?


@  -No


Poses a threat to life or bodily function? How? (Chest pain, USA, MI, pneumonia,

 PE, COPD, DKA, ARF, appy, cholecystitis, CVA, Diverticulitis, Homicidal, 

Suicidal, threat to staff... and all critical care pts)


@  -No








- Lab Data


Result diagrams: 


                                 04/04/23 01:04





                                 04/04/23 01:04


                                   Lab Results











  04/04/23 04/04/23 04/04/23 Range/Units





  01:04 01:04 01:04 


 


WBC  6.6    (3.8-10.6)  k/uL


 


RBC  5.00    (4.30-5.90)  m/uL


 


Hgb  15.9    (13.0-17.5)  gm/dL


 


Hct  44.1    (39.0-53.0)  %


 


MCV  88.2    (80.0-100.0)  fL


 


MCH  31.9    (25.0-35.0)  pg


 


MCHC  36.2    (31.0-37.0)  g/dL


 


RDW  12.5    (11.5-15.5)  %


 


Plt Count  267    (150-450)  k/uL


 


MPV  7.9    


 


Neutrophils %  49    %


 


Lymphocytes %  38    %


 


Monocytes %  7    %


 


Eosinophils %  3    %


 


Basophils %  0    %


 


Neutrophils #  3.3    (1.3-7.7)  k/uL


 


Lymphocytes #  2.5    (1.0-4.8)  k/uL


 


Monocytes #  0.5    (0-1.0)  k/uL


 


Eosinophils #  0.2    (0-0.7)  k/uL


 


Basophils #  0.0    (0-0.2)  k/uL


 


PT   9.7   (9.0-12.0)  sec


 


INR   0.9   (<1.2)  


 


APTT   24.6   (22.0-30.0)  sec


 


Sodium    139  (137-145)  mmol/L


 


Potassium    3.7  (3.5-5.1)  mmol/L


 


Chloride    105  ()  mmol/L


 


Carbon Dioxide    25  (22-30)  mmol/L


 


Anion Gap    9  mmol/L


 


BUN    19  (9-20)  mg/dL


 


Creatinine    0.58 L  (0.66-1.25)  mg/dL


 


Est GFR (CKD-EPI)AfAm    >90  (>60 ml/min/1.73 sqM)  


 


Est GFR (CKD-EPI)NonAf    >90  (>60 ml/min/1.73 sqM)  


 


Glucose    95  (74-99)  mg/dL


 


Calcium    8.9  (8.4-10.2)  mg/dL


 


Total Bilirubin    0.6  (0.2-1.3)  mg/dL


 


AST    30  (17-59)  U/L


 


ALT    27  (4-49)  U/L


 


Alkaline Phosphatase    71  ()  U/L


 


Troponin I     (0.000-0.034)  ng/mL


 


Total Protein    7.3  (6.3-8.2)  g/dL


 


Albumin    4.3  (3.5-5.0)  g/dL














  04/04/23 Range/Units





  01:04 


 


WBC   (3.8-10.6)  k/uL


 


RBC   (4.30-5.90)  m/uL


 


Hgb   (13.0-17.5)  gm/dL


 


Hct   (39.0-53.0)  %


 


MCV   (80.0-100.0)  fL


 


MCH   (25.0-35.0)  pg


 


MCHC   (31.0-37.0)  g/dL


 


RDW   (11.5-15.5)  %


 


Plt Count   (150-450)  k/uL


 


MPV   


 


Neutrophils %   %


 


Lymphocytes %   %


 


Monocytes %   %


 


Eosinophils %   %


 


Basophils %   %


 


Neutrophils #   (1.3-7.7)  k/uL


 


Lymphocytes #   (1.0-4.8)  k/uL


 


Monocytes #   (0-1.0)  k/uL


 


Eosinophils #   (0-0.7)  k/uL


 


Basophils #   (0-0.2)  k/uL


 


PT   (9.0-12.0)  sec


 


INR   (<1.2)  


 


APTT   (22.0-30.0)  sec


 


Sodium   (137-145)  mmol/L


 


Potassium   (3.5-5.1)  mmol/L


 


Chloride   ()  mmol/L


 


Carbon Dioxide   (22-30)  mmol/L


 


Anion Gap   mmol/L


 


BUN   (9-20)  mg/dL


 


Creatinine   (0.66-1.25)  mg/dL


 


Est GFR (CKD-EPI)AfAm   (>60 ml/min/1.73 sqM)  


 


Est GFR (CKD-EPI)NonAf   (>60 ml/min/1.73 sqM)  


 


Glucose   (74-99)  mg/dL


 


Calcium   (8.4-10.2)  mg/dL


 


Total Bilirubin   (0.2-1.3)  mg/dL


 


AST   (17-59)  U/L


 


ALT   (4-49)  U/L


 


Alkaline Phosphatase   ()  U/L


 


Troponin I  <0.012  (0.000-0.034)  ng/mL


 


Total Protein   (6.3-8.2)  g/dL


 


Albumin   (3.5-5.0)  g/dL














Disposition


Clinical Impression: 


 Vasovagal syncope





Disposition: HOME SELF-CARE


Condition: Stable


Is patient prescribed a controlled substance at d/c from ED?: No


Referrals: 


None,Stated [Primary Care Provider] - 1-2 days


Time of Disposition: 04:40